# Patient Record
Sex: FEMALE | Race: WHITE | NOT HISPANIC OR LATINO | Employment: FULL TIME | URBAN - METROPOLITAN AREA
[De-identification: names, ages, dates, MRNs, and addresses within clinical notes are randomized per-mention and may not be internally consistent; named-entity substitution may affect disease eponyms.]

---

## 2018-06-29 ENCOUNTER — TELEPHONE (OUTPATIENT)
Dept: GENETICS | Facility: CLINIC | Age: 58
End: 2018-06-29

## 2018-06-29 NOTE — TELEPHONE ENCOUNTER
Called the home to offer a different appt. I was told the patient was at work. I have rescheduled her to the next day. I have updated the system with the current home address. The appt confirmation letter will be mailed to the home.

## 2018-11-06 ENCOUNTER — OFFICE VISIT (OUTPATIENT)
Dept: NEUROLOGY | Facility: CLINIC | Age: 58
End: 2018-11-06
Payer: COMMERCIAL

## 2018-11-06 VITALS
HEART RATE: 72 BPM | BODY MASS INDEX: 28.89 KG/M2 | DIASTOLIC BLOOD PRESSURE: 84 MMHG | SYSTOLIC BLOOD PRESSURE: 146 MMHG | RESPIRATION RATE: 16 BRPM | WEIGHT: 184.06 LBS | HEIGHT: 67 IN

## 2018-11-06 DIAGNOSIS — R20.0 NUMBNESS OF RIGHT HAND: Primary | ICD-10-CM

## 2018-11-06 DIAGNOSIS — R20.0 NUMBNESS AND TINGLING OF BOTH FEET: ICD-10-CM

## 2018-11-06 DIAGNOSIS — R20.2 NUMBNESS AND TINGLING OF BOTH FEET: ICD-10-CM

## 2018-11-06 DIAGNOSIS — R20.0 NUMBNESS IN RIGHT LEG: ICD-10-CM

## 2018-11-06 PROCEDURE — 99999 PR PBB SHADOW E&M-EST. PATIENT-LVL III: CPT | Mod: PBBFAC,,, | Performed by: PSYCHIATRY & NEUROLOGY

## 2018-11-06 PROCEDURE — 99204 OFFICE O/P NEW MOD 45 MIN: CPT | Mod: S$GLB,,, | Performed by: PSYCHIATRY & NEUROLOGY

## 2018-11-06 PROCEDURE — 3008F BODY MASS INDEX DOCD: CPT | Mod: CPTII,S$GLB,, | Performed by: PSYCHIATRY & NEUROLOGY

## 2018-11-06 RX ORDER — CETIRIZINE HYDROCHLORIDE 10 MG/1
10 TABLET ORAL DAILY
COMMUNITY

## 2018-11-06 RX ORDER — METOPROLOL SUCCINATE 25 MG/1
0.5 TABLET, EXTENDED RELEASE ORAL DAILY
Refills: 5 | COMMUNITY
Start: 2018-10-09 | End: 2019-02-12

## 2018-11-06 RX ORDER — TALC
1 POWDER (GRAM) TOPICAL NIGHTLY
COMMUNITY

## 2018-11-06 RX ORDER — ALPHA LIPOIC ACID 300 MG
1 CAPSULE ORAL DAILY
COMMUNITY
End: 2019-02-12
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
## 2018-11-07 ENCOUNTER — LAB VISIT (OUTPATIENT)
Dept: LAB | Facility: HOSPITAL | Age: 58
End: 2018-11-07
Attending: PSYCHIATRY & NEUROLOGY
Payer: COMMERCIAL

## 2018-11-07 DIAGNOSIS — R20.2 NUMBNESS AND TINGLING OF BOTH FEET: ICD-10-CM

## 2018-11-07 DIAGNOSIS — R20.0 NUMBNESS OF RIGHT HAND: ICD-10-CM

## 2018-11-07 DIAGNOSIS — R20.0 NUMBNESS IN RIGHT LEG: ICD-10-CM

## 2018-11-07 DIAGNOSIS — R20.0 NUMBNESS AND TINGLING OF BOTH FEET: ICD-10-CM

## 2018-11-07 LAB
GLUCOSE SERPL-MCNC: 93 MG/DL
TSH SERPL DL<=0.005 MIU/L-ACNC: 1.01 UIU/ML
VIT B12 SERPL-MCNC: 1053 PG/ML

## 2018-11-07 PROCEDURE — 36415 COLL VENOUS BLD VENIPUNCTURE: CPT

## 2018-11-07 PROCEDURE — 82947 ASSAY GLUCOSE BLOOD QUANT: CPT

## 2018-11-07 PROCEDURE — 84443 ASSAY THYROID STIM HORMONE: CPT

## 2018-11-07 PROCEDURE — 82607 VITAMIN B-12: CPT

## 2018-12-07 ENCOUNTER — PROCEDURE VISIT (OUTPATIENT)
Dept: NEUROLOGY | Facility: CLINIC | Age: 58
End: 2018-12-07
Payer: COMMERCIAL

## 2018-12-07 DIAGNOSIS — R20.2 NUMBNESS AND TINGLING OF BOTH FEET: ICD-10-CM

## 2018-12-07 DIAGNOSIS — R20.0 NUMBNESS OF RIGHT HAND: ICD-10-CM

## 2018-12-07 DIAGNOSIS — G56.03 BILATERAL CARPAL TUNNEL SYNDROME: Primary | ICD-10-CM

## 2018-12-07 DIAGNOSIS — G57.11 MERALGIA PARAESTHETICA, RIGHT: ICD-10-CM

## 2018-12-07 DIAGNOSIS — R20.0 NUMBNESS AND TINGLING OF BOTH FEET: ICD-10-CM

## 2018-12-07 DIAGNOSIS — R20.0 NUMBNESS IN RIGHT LEG: ICD-10-CM

## 2018-12-07 PROCEDURE — 95913 NRV CNDJ TEST 13/> STUDIES: CPT | Mod: S$GLB,,, | Performed by: PSYCHIATRY & NEUROLOGY

## 2018-12-07 PROCEDURE — 95886 MUSC TEST DONE W/N TEST COMP: CPT | Mod: S$GLB,,, | Performed by: PSYCHIATRY & NEUROLOGY

## 2018-12-07 NOTE — PROCEDURES
REPORT OF EMG and NERVE CONDUCTION STUDY    Name: Lindsay Jean   Date of Study:  12/7/18  Referring Physician:  Destiny  Test Performed by:  MD Destiny  Full Values Attached  Informed Consent Scanned.   No anesthesia used.   Amount of Blood Loss: none. The patient tolerated this procedure well.       Informed consent was obtained prior to performing this study. Two patient identifiers were confirmed with the patient prior to performing this study. A time out to determine correct patient and and agreement on procedure performed was conducted prior to the concentric needle examination.    Reason for the study:  Numb feet, numb right hand and numb, right thigh      Findings:   Nerve conduction studies of the bilateral median (motor and sensory  )nerves, bilateral ulnar (motor ) nerve,right ulnar sensory nerve,  right radial sensory nerve, bilateral peroneal motor, bilateral tibial motor, bilateral sural nerves, bilateral lateral femoral cutaneous nerves of the thigh, and bilateral H-reflexes were performed. Right Femoral cutataneous sensory nerve was absent. Bilateral median palm to wrist latency was mildly prolonged to 2.6ms  Otherwise, amplitudes, distal latencies, conduction velocities, and F-waves were normal. H reflexes were symetric  EMG of selected muscles of the bilateral arms and bilateral legs were performed as indicated on the attached sheets.Insertional activity was normal without fasciculation or fibrillation, normal sized and phasia of motor units.      Impression:  Abnormal Study secondary to the Presence of:    1. Mild/early Carpal Tunnel Syndrome bilaterally  2. Right Meralgia Paraesthetica.   NO large fiber peripheral neuropathy demonstrated on this study.       Jose David Dixon M.D. [facility].     Recommendations (discussed at this visit):  1. Wear a brace nightly for CTS on the right wrist  2. Avoid tight fitting cloths at the waist/ focus on abdominal weight loss for MP. She  "only rarely has symptoms of this and does not require other specific treatment. I advise she follow fasting glucose for the development of DM2 over time given her family history and occurrence of this focal neuropathy  3. Note: She is pending follow up with genetics about a possible mitochondrial disorder  4. Reassuring is no large fiber neuropathy found today. She complains of more sensitivity to touch in the feet. Expand neuropathy panel with SPEP/KUSUM, Vit B1, B3, B5, B6, Folate, Copper CMP, CBC, and Vit E. TSH, B12, and fasting glucose were normal in 11/2018. Symptoms may be related to possible mitochondrial disorder if work up continues to be negative. A small fiber skin sampling could be considered for any worsening symptoms.   5. She reports "bumps" in the right upper arm medially. I did not palpate any lesions today. I advised her to see PCP for further work up should this worsen or fail to improve.     RTC 2 months  "

## 2018-12-26 ENCOUNTER — OFFICE VISIT (OUTPATIENT)
Dept: GENETICS | Facility: CLINIC | Age: 58
End: 2018-12-26
Payer: COMMERCIAL

## 2018-12-26 VITALS
WEIGHT: 185.44 LBS | DIASTOLIC BLOOD PRESSURE: 71 MMHG | BODY MASS INDEX: 29.1 KG/M2 | HEART RATE: 67 BPM | HEIGHT: 67 IN | SYSTOLIC BLOOD PRESSURE: 136 MMHG

## 2018-12-26 DIAGNOSIS — Z72.0 TOBACCO ABUSE: ICD-10-CM

## 2018-12-26 DIAGNOSIS — Z71.6 TOBACCO ABUSE COUNSELING: ICD-10-CM

## 2018-12-26 DIAGNOSIS — R63.5 WEIGHT GAIN: ICD-10-CM

## 2018-12-26 DIAGNOSIS — R53.83 FATIGUE, UNSPECIFIED TYPE: ICD-10-CM

## 2018-12-26 DIAGNOSIS — E66.3 OVERWEIGHT (BMI 25.0-29.9): ICD-10-CM

## 2018-12-26 DIAGNOSIS — K76.0 FATTY LIVER: Primary | ICD-10-CM

## 2018-12-26 PROCEDURE — 99205 OFFICE O/P NEW HI 60 MIN: CPT | Mod: S$GLB,,, | Performed by: MEDICAL GENETICS

## 2018-12-26 PROCEDURE — 3008F BODY MASS INDEX DOCD: CPT | Mod: CPTII,S$GLB,, | Performed by: MEDICAL GENETICS

## 2018-12-26 PROCEDURE — 99999 PR PBB SHADOW E&M-EST. PATIENT-LVL III: CPT | Mod: PBBFAC,,, | Performed by: MEDICAL GENETICS

## 2018-12-26 NOTE — PROGRESS NOTES
"Lindsay Jean  DOS: 18  : [birth year]  MRN: [id number]    PRESENT ILLNESS: Chelita seen this 58-year-old  female 6 years ago for a possible genetic etiology of her GI problems and fatigue. She is a cousin of my other patient Suly ([id number]) who I have been treating for mitochondrial dysfunction. 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 I have then tested Suly's mother Aida ([id number]) who also had the same mutation. Since Ms. Jean has a lot of the similar symptoms of waxing and waning of GI problems and energy levels, she was recommended by her cousin Suly to present for a genetic evaluation. 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    At the initial visit, I got a history of waxing and waning energy levels, easy fatigability and GI symptoms with primarily diarrhea. She also reported myalgia, cramps, paresthesias, fatigue and fatty liver. She takes a 2-3 hour nap every day when she comes back from work. She has severe energy loss after simple cold and it takes her a long time to recover. She did complain about muscle spasms and myalgia in her legs and arms especially after her cholesterol medication was switched from Welchol to Pravastatin. I have recommended her to start the vitamin cocktail including ubiquinol, carnitine and creatine and discussed the importance of nutrition (avoidance of fasting) and exercise. She did state that the vitamins gave her more energy and she needed less sleep.    Ms. Jean is now here for a follow-up after a 6-year hiatus. She still has significant fatigue and takes a 1-2 hr nap every day. She still hasnt quit smoking although Chelita discussed it at previous visits.    PAST MEDICAL HISTORY: "Gastric issues," i.e. " "bouts of diarrhea for many years. She has fatty liver. She did report a waxing and waning nature of GI symptoms as well as energy levels. She also reported myalgia, cramps, paresthesias and fatigue. She takes a 2-3 hour nap every day when she comes back from work. She has severe energy loss after simple cold and it takes her a long time to recover. She also had hysterectomy for fibroids and oophorectomy two years ago.  In addition, she has history of HTN, depression, and MVP high cholesterol. She also has idiopathic fatty liver.    MEDICATIONS:   alpha lipoic acid 300 mg Cap    cetirizine (ZYRTEC) 10 MG tablet   coQ10, ubiquinol, 200 mg Cap   escitalopram (LEXAPRO) 10 MG tablet   melatonin 3 mg Tab   metoprolol succinate (TOPROL-XL) 25 MG 24 hr tablet     FAMILY HISTORY: Ms. Jean has a 61-year-old sister Ellie who is healthy and 63-year-old sister Marie ([id number]) with fatigue. Her mother Kirsty is 74 and has good energy levels. Her maternal aunt Pooja is 72 and has high energy levels. Ms. Jean has another maternal aunt Aida who is 70 and who has fatigue along with her daughter Suly and two of her grandsons Serg and Angus. The patient's grandmother is not alive, but from what she remembers, she was "never healthy and very frail." Ms. Jean has no children.    SOCIAL HISTORY: long-term tobacco smoker (41 years), ½ a pack/day, trying to quit for a long time. She is single with no children, living with her boyfriend of 27 years and has 2 dogs.    PHYSICAL EXAMINATION: Weight was 185 lbs (33 lbs gain since 2012) and height 5'7", BMI 29. Ms. Jean appeared to be a pleasant nondysmorphic female. She had no appreciable dysmorphic facial features and was alert and oriented x3. There was no evidence of ophthalmoplegia. She seemed quite energetic today.    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 So we still dont have a clear etiology of the patients symptoms. The yield on any genetic testing in my experience in the patient with this phenotype is low.     I did talk to her about a possibility of chronic fatigue syndrome (CFS) that better describes her phenotype (Neeraj et al. 2018). She could also have an autoimmune process causing energy depletion with possible secondary mitochondrial dysfunction. However, theres no clear etiology known for CFS at this time.    RECOMMENDATIONS:  1. Exercise and prevent overexertion.  2. Diet and weight loss.  3. Smoking cessation.  4. Follow up in 1-2 years.    REFERENCES:  1. Neeraj et al. On chronic fatigue syndrome and nosological categories. Clinical Rheumatology (2018) 37:7172-4182.  2. Trini ENRIQUEZ, Donny R, Kahler S. Primary Mitochondrial Disease and Secondary Mitochondrial   Dysfunction: Importance of Distinction for Diagnosis and Treatment. Mol Syndromol 2016 Jul;7(3):122-37. https://www.ncbi.nlm.nih.gov/pmc/articles/DHI9119524/    Time spent: 60 minutes, more than 50% was spent in counseling. The note is in epic.     Estrada Feliz M.D.  Section Head - Medical Genetics   [facility]

## 2019-02-06 ENCOUNTER — LAB VISIT (OUTPATIENT)
Dept: LAB | Facility: HOSPITAL | Age: 59
End: 2019-02-06
Attending: PSYCHIATRY & NEUROLOGY
Payer: COMMERCIAL

## 2019-02-06 DIAGNOSIS — R20.2 NUMBNESS AND TINGLING OF BOTH FEET: ICD-10-CM

## 2019-02-06 DIAGNOSIS — R20.0 NUMBNESS AND TINGLING OF BOTH FEET: ICD-10-CM

## 2019-02-06 LAB
25(OH)D3+25(OH)D2 SERPL-MCNC: 43 NG/ML
ALBUMIN SERPL BCP-MCNC: 4.1 G/DL
ALP SERPL-CCNC: 88 U/L
ALT SERPL W/O P-5'-P-CCNC: 38 U/L
ANION GAP SERPL CALC-SCNC: 10 MMOL/L
AST SERPL-CCNC: 34 U/L
BASOPHILS # BLD AUTO: 0.06 K/UL
BASOPHILS NFR BLD: 0.9 %
BILIRUB SERPL-MCNC: 0.5 MG/DL
BUN SERPL-MCNC: 10 MG/DL
CALCIUM SERPL-MCNC: 9.8 MG/DL
CHLORIDE SERPL-SCNC: 107 MMOL/L
CO2 SERPL-SCNC: 26 MMOL/L
CREAT SERPL-MCNC: 0.8 MG/DL
DIFFERENTIAL METHOD: NORMAL
EOSINOPHIL # BLD AUTO: 0.3 K/UL
EOSINOPHIL NFR BLD: 3.8 %
ERYTHROCYTE [DISTWIDTH] IN BLOOD BY AUTOMATED COUNT: 13.4 %
EST. GFR  (AFRICAN AMERICAN): >60 ML/MIN/1.73 M^2
EST. GFR  (NON AFRICAN AMERICAN): >60 ML/MIN/1.73 M^2
FOLATE SERPL-MCNC: 16.3 NG/ML
GLUCOSE SERPL-MCNC: 92 MG/DL
HCT VFR BLD AUTO: 41.7 %
HGB BLD-MCNC: 14.2 G/DL
LYMPHOCYTES # BLD AUTO: 2.4 K/UL
LYMPHOCYTES NFR BLD: 35.5 %
MCH RBC QN AUTO: 30.2 PG
MCHC RBC AUTO-ENTMCNC: 34.1 G/DL
MCV RBC AUTO: 89 FL
MONOCYTES # BLD AUTO: 0.6 K/UL
MONOCYTES NFR BLD: 9.3 %
NEUTROPHILS # BLD AUTO: 3.4 K/UL
NEUTROPHILS NFR BLD: 50.5 %
PLATELET # BLD AUTO: 270 K/UL
PMV BLD AUTO: 9.5 FL
POTASSIUM SERPL-SCNC: 4.1 MMOL/L
PROT SERPL-MCNC: 7.6 G/DL
RBC # BLD AUTO: 4.7 M/UL
SODIUM SERPL-SCNC: 143 MMOL/L
WBC # BLD AUTO: 6.79 K/UL

## 2019-02-06 PROCEDURE — 82306 VITAMIN D 25 HYDROXY: CPT

## 2019-02-06 PROCEDURE — 36415 COLL VENOUS BLD VENIPUNCTURE: CPT

## 2019-02-06 PROCEDURE — 84591 ASSAY OF NOS VITAMIN: CPT | Mod: 91

## 2019-02-06 PROCEDURE — 82746 ASSAY OF FOLIC ACID SERUM: CPT

## 2019-02-06 PROCEDURE — 86334 IMMUNOFIX E-PHORESIS SERUM: CPT

## 2019-02-06 PROCEDURE — 85025 COMPLETE CBC W/AUTO DIFF WBC: CPT

## 2019-02-06 PROCEDURE — 84446 ASSAY OF VITAMIN E: CPT

## 2019-02-06 PROCEDURE — 80053 COMPREHEN METABOLIC PANEL: CPT

## 2019-02-06 PROCEDURE — 82525 ASSAY OF COPPER: CPT

## 2019-02-06 PROCEDURE — 84165 PROTEIN E-PHORESIS SERUM: CPT | Mod: 26,,, | Performed by: PATHOLOGY

## 2019-02-06 PROCEDURE — 84165 PROTEIN E-PHORESIS SERUM: CPT

## 2019-02-06 PROCEDURE — 84165 PATHOLOGIST INTERPRETATION SPE: ICD-10-PCS | Mod: 26,,, | Performed by: PATHOLOGY

## 2019-02-06 PROCEDURE — 84591 ASSAY OF NOS VITAMIN: CPT | Mod: 59

## 2019-02-06 PROCEDURE — 84207 ASSAY OF VITAMIN B-6: CPT

## 2019-02-06 PROCEDURE — 86334 PATHOLOGIST INTERPRETATION IFE: ICD-10-PCS | Mod: 26,,, | Performed by: PATHOLOGY

## 2019-02-06 PROCEDURE — 84425 ASSAY OF VITAMIN B-1: CPT

## 2019-02-06 PROCEDURE — 86334 IMMUNOFIX E-PHORESIS SERUM: CPT | Mod: 26,,, | Performed by: PATHOLOGY

## 2019-02-07 LAB
ALBUMIN SERPL ELPH-MCNC: 4.72 G/DL
ALPHA1 GLOB SERPL ELPH-MCNC: 0.27 G/DL
ALPHA2 GLOB SERPL ELPH-MCNC: 0.74 G/DL
B-GLOBULIN SERPL ELPH-MCNC: 0.93 G/DL
GAMMA GLOB SERPL ELPH-MCNC: 0.94 G/DL
INTERPRETATION SERPL IFE-IMP: NORMAL
PATHOLOGIST INTERPRETATION IFE: NORMAL
PATHOLOGIST INTERPRETATION SPE: NORMAL
PROT SERPL-MCNC: 7.6 G/DL

## 2019-02-08 LAB — COPPER SERPL-MCNC: 1148 UG/L (ref 810–1990)

## 2019-02-11 LAB
PYRIDOXAL SERPL-MCNC: 72 UG/L (ref 5–50)
VIT B1 BLD-MCNC: 64 UG/L (ref 38–122)

## 2019-02-12 ENCOUNTER — OFFICE VISIT (OUTPATIENT)
Dept: NEUROLOGY | Facility: CLINIC | Age: 59
End: 2019-02-12
Payer: COMMERCIAL

## 2019-02-12 VITALS
DIASTOLIC BLOOD PRESSURE: 86 MMHG | WEIGHT: 184.94 LBS | BODY MASS INDEX: 29.03 KG/M2 | SYSTOLIC BLOOD PRESSURE: 128 MMHG | HEIGHT: 67 IN | RESPIRATION RATE: 16 BRPM | HEART RATE: 72 BPM

## 2019-02-12 DIAGNOSIS — M79.672 FOOT PAIN, BILATERAL: ICD-10-CM

## 2019-02-12 DIAGNOSIS — M79.18 CERVICAL MYOFASCIAL PAIN SYNDROME: Primary | ICD-10-CM

## 2019-02-12 DIAGNOSIS — M79.671 FOOT PAIN, BILATERAL: ICD-10-CM

## 2019-02-12 DIAGNOSIS — M79.601 RIGHT ARM PAIN: ICD-10-CM

## 2019-02-12 DIAGNOSIS — G57.11 MERALGIA PARESTHETICA OF RIGHT SIDE: ICD-10-CM

## 2019-02-12 DIAGNOSIS — G56.03 BILATERAL CARPAL TUNNEL SYNDROME: ICD-10-CM

## 2019-02-12 DIAGNOSIS — M54.2 NECK PAIN: ICD-10-CM

## 2019-02-12 PROCEDURE — 3008F BODY MASS INDEX DOCD: CPT | Mod: CPTII,S$GLB,, | Performed by: NURSE PRACTITIONER

## 2019-02-12 PROCEDURE — 99999 PR PBB SHADOW E&M-EST. PATIENT-LVL IV: CPT | Mod: PBBFAC,,, | Performed by: NURSE PRACTITIONER

## 2019-02-12 PROCEDURE — 99214 OFFICE O/P EST MOD 30 MIN: CPT | Mod: S$GLB,,, | Performed by: NURSE PRACTITIONER

## 2019-02-12 PROCEDURE — 99999 PR PBB SHADOW E&M-EST. PATIENT-LVL IV: ICD-10-PCS | Mod: PBBFAC,,, | Performed by: NURSE PRACTITIONER

## 2019-02-12 PROCEDURE — 3008F PR BODY MASS INDEX (BMI) DOCUMENTED: ICD-10-PCS | Mod: CPTII,S$GLB,, | Performed by: NURSE PRACTITIONER

## 2019-02-12 PROCEDURE — 99214 PR OFFICE/OUTPT VISIT, EST, LEVL IV, 30-39 MIN: ICD-10-PCS | Mod: S$GLB,,, | Performed by: NURSE PRACTITIONER

## 2019-02-12 NOTE — PROGRESS NOTES
HPI: Lindsay Jean is a 58 y.o. female with tingling in the right arm more than the right leg in 2018. EMG 2018 showed mild early bilateral CTS and right meralgia paresthetica; neuropathy labs unremarkable. She has cervical disc disease chronically. She works as a .    She present today for a follow up visit. She denies any CT complaints. Her right thigh paresthesias occur intermittently and are tolerable. Neuropathy profile was overall unremarkable. She continues with right cervical complaints, which radiate into her right shoulder at times.     No foot pain complaints at this time.     Review of Systems   Constitutional: Negative for fever.   HENT: Negative for nosebleeds.    Eyes: Negative for double vision.   Respiratory: Negative for hemoptysis.    Cardiovascular: Negative for leg swelling.   Gastrointestinal: Negative for blood in stool.   Genitourinary: Negative for hematuria.   Musculoskeletal: Positive for neck pain. Negative for falls.   Skin: Negative for rash.   Neurological: Positive for sensory change.   Psychiatric/Behavioral: The patient does not have insomnia.        I have reviewed all of this patient's past medical and surgical histories as well as family and social histories and active allergies and medications as documented in the electronic medical record.    Exam:  Gen Appearance, well developed/nourished in no apparent distress  CV: 2+ distal pulses with no edema or swelling  Neuro:  MS: Awake, alert, oriented to place, person, time, situation. Sustains attention. Recent/remote memory intact, Language is full to spontaneous speech/repetition/naming/comprehension. Fund of Knowledge is full  CN: Optic discs are flat with normal vasculature, PERRL, Extraoccular movements and visual fields are full. Normal facial sensation and strength, Hearing symmetric, Tongue and Palate are midline and strong. Shoulder Shrug symmetric and strong.  Motor: Normal bulk, tone, no abnormal movements.  "5/5 strength bilateral upper/lower extremities with 2+ reflexes and bilateral plantar response  Sensory: symmetric to light touch, pain, temp, and vibration/proprioception. Romberg negative  Cerebellar: Finger-nose,Heal-shin, Rapid alternating movements intact  Gait: Normal stance, no ataxia  MSK survey: palpable trigger points to the right trapezius; right cervical paraspinal tenderness    Labs:   2014 CK normal  TSH, B12, and fasting glucose were normal in 11/2018.   2/2019: SPEP/KUSUM, Vit B1, B3, B5, B6, Folate, Copper CMP, CBC, and Vit E all WNL.     EMG BUE and BLE 2018:  Impression:  Abnormal Study secondary to the Presence of:    1. Mild/early Carpal Tunnel Syndrome bilaterally  2. Right Meralgia Paraesthetica.   NO large fiber peripheral neuropathy demonstrated on this study.     Assessment/Plan: Lindsay Jean is a 58 y.o. female with tingling in the right arm more than the right leg in 2018. EMG 2018 showed mild early bilateral CTS and right meralgia paresthetica; neuropathy labs unremarkable. She has cervical disc disease chronically. She works as a .    I recommend:   1. PT for what I suspect to be cervical myofascial pain affecting the RUE. No radiculopathy on EMG 2018.   2. Bracing could be used for CTS, but she has no complaints currently.   3. Right meralgia complaints occur intermittently and are not bothersome.   4. She saw Hepatology for fatty liver prior and genetics who noted "These results are suggestive but not diagnostic of abnormal cellular energy metabolism and possible mitochondrial dysfunction that could explain her energy and GI issues. She also has a family history of heather."  5. Prior complaint of foot pain could be related to mitochondrial disease versus a small fiber neuropathy. Skin biopsy could be considered at any point if appropriate/needed from a diagnostic standpoint.     FU 3 months          "

## 2019-02-13 PROBLEM — M79.672 FOOT PAIN, BILATERAL: Status: ACTIVE | Noted: 2019-02-13

## 2019-02-13 PROBLEM — G56.03 BILATERAL CARPAL TUNNEL SYNDROME: Status: ACTIVE | Noted: 2019-02-13

## 2019-02-13 PROBLEM — G57.11 MERALGIA PARESTHETICA OF RIGHT SIDE: Status: ACTIVE | Noted: 2019-02-13

## 2019-02-13 PROBLEM — M79.671 FOOT PAIN, BILATERAL: Status: ACTIVE | Noted: 2019-02-13

## 2019-02-13 LAB — A-TOCOPHEROL VIT E SERPL-MCNC: 2979 UG/DL (ref 500–1800)

## 2019-02-15 LAB — PANTOTHENATE SERPLBLD-MCNC: NORMAL UG/L

## 2019-02-21 LAB — NIACIN SERPL-MCNC: 3.28 UG/ML (ref 0.5–8.45)
